# Patient Record
Sex: FEMALE | Race: WHITE
[De-identification: names, ages, dates, MRNs, and addresses within clinical notes are randomized per-mention and may not be internally consistent; named-entity substitution may affect disease eponyms.]

---

## 2018-05-31 ENCOUNTER — HOSPITAL ENCOUNTER (OUTPATIENT)
Age: 53
End: 2018-05-31
Payer: COMMERCIAL

## 2018-05-31 DIAGNOSIS — Z12.31: Primary | ICD-10-CM

## 2018-05-31 PROCEDURE — 77063 BREAST TOMOSYNTHESIS BI: CPT

## 2018-05-31 PROCEDURE — 77067 SCR MAMMO BI INCL CAD: CPT

## 2019-06-19 ENCOUNTER — HOSPITAL ENCOUNTER (OUTPATIENT)
Age: 54
Discharge: HOME | End: 2019-06-19
Payer: COMMERCIAL

## 2019-06-19 DIAGNOSIS — Z12.31: Primary | ICD-10-CM

## 2019-06-19 PROCEDURE — 77067 SCR MAMMO BI INCL CAD: CPT

## 2019-06-19 PROCEDURE — 77063 BREAST TOMOSYNTHESIS BI: CPT

## 2020-07-09 ENCOUNTER — HOSPITAL ENCOUNTER (OUTPATIENT)
Age: 55
End: 2020-07-09
Payer: COMMERCIAL

## 2020-07-09 DIAGNOSIS — Z12.31: Primary | ICD-10-CM

## 2020-07-09 PROCEDURE — 77063 BREAST TOMOSYNTHESIS BI: CPT

## 2020-07-09 PROCEDURE — 77067 SCR MAMMO BI INCL CAD: CPT

## 2022-05-19 ENCOUNTER — HOSPITAL ENCOUNTER (OUTPATIENT)
Dept: HOSPITAL 100 - OPBI | Age: 57
Discharge: HOME | End: 2022-05-19
Payer: COMMERCIAL

## 2022-05-19 DIAGNOSIS — E11.9: ICD-10-CM

## 2022-05-19 DIAGNOSIS — Z12.31: Primary | ICD-10-CM

## 2022-05-19 DIAGNOSIS — E55.9: ICD-10-CM

## 2022-05-19 DIAGNOSIS — E78.5: ICD-10-CM

## 2022-05-19 LAB
ALANINE AMINOTRANSFER ALT/SGPT: 76 U/L (ref 13–56)
ALBUMIN SERPL-MCNC: 3.7 G/DL (ref 3.2–5)
ALKALINE PHOSPHATASE: 101 U/L (ref 45–117)
ANION GAP: 10 (ref 5–15)
AST(SGOT): 35 U/L (ref 15–37)
BUN SERPL-MCNC: 13 MG/DL (ref 7–18)
BUN/CREAT RATIO: 17.9 RATIO (ref 10–20)
CALCIUM SERPL-MCNC: 9.5 MG/DL (ref 8.5–10.1)
CARBON DIOXIDE: 24 MMOL/L (ref 21–32)
CHLORIDE: 104 MMOL/L (ref 98–107)
CHOLEST SERPL-MCNC: 178 MG/DL
CREAT UR-MCNC: 101 MG/DL
DEPRECATED RDW RBC: 43 FL (ref 35.1–43.9)
ERYTHROCYTE [DISTWIDTH] IN BLOOD: 13.2 % (ref 11.6–14.6)
EST GLOM FILT RATE - AFR AMER: 106 ML/MIN (ref 60–?)
GLOBULIN: 3.8 G/DL (ref 2.2–4.2)
GLUCOSE: 114 MG/DL (ref 74–106)
HCT VFR BLD AUTO: 46.5 % (ref 37–47)
HEMOGLOBIN: 15 G/DL (ref 12–15)
HGB BLD-MCNC: 15 G/DL (ref 12–15)
IMMATURE GRANULOCYTES COUNT: 0.14 X10^3/UL (ref 0–0)
MCV RBC: 89.1 FL (ref 81–99)
MEAN CORP HGB CONC: 32.3 G/DL (ref 32–36)
MEAN PLATELET VOL.: 10.4 FL (ref 6.2–12)
MICROALBUMIN UR-MCNC: 6.1 MG/L
NRBC FLAGGED BY ANALYZER: 0 % (ref 0–5)
PLATELET # BLD: 275 K/MM3 (ref 150–450)
PLATELET COUNT: 275 K/MM3 (ref 150–450)
POTASSIUM: 3.9 MMOL/L (ref 3.5–5.1)
RBC # BLD AUTO: 5.22 M/MM3 (ref 4.2–5.4)
RBC DISTRIBUTION WIDTH CV: 13.2 % (ref 11.6–14.6)
RBC DISTRIBUTION WIDTH SD: 43 FL (ref 35.1–43.9)
TRIGLYCERIDES: 107 MG/DL
VITAMIN D,25 HYDROXY: 22.4 NG/ML
VLDLC SERPL-MCNC: 21 MG/DL (ref 5–40)
WBC # BLD AUTO: 13.2 K/MM3 (ref 4.4–11)
WHITE BLOOD COUNT: 13.2 K/MM3 (ref 4.4–11)

## 2022-05-19 PROCEDURE — 77063 BREAST TOMOSYNTHESIS BI: CPT

## 2022-05-19 PROCEDURE — 36415 COLL VENOUS BLD VENIPUNCTURE: CPT

## 2022-05-19 PROCEDURE — 77067 SCR MAMMO BI INCL CAD: CPT

## 2022-05-19 PROCEDURE — 80053 COMPREHEN METABOLIC PANEL: CPT

## 2022-05-19 PROCEDURE — 82570 ASSAY OF URINE CREATININE: CPT

## 2022-05-19 PROCEDURE — 84443 ASSAY THYROID STIM HORMONE: CPT

## 2022-05-19 PROCEDURE — 85025 COMPLETE CBC W/AUTO DIFF WBC: CPT

## 2022-05-19 PROCEDURE — 82043 UR ALBUMIN QUANTITATIVE: CPT

## 2022-05-19 PROCEDURE — 82306 VITAMIN D 25 HYDROXY: CPT

## 2022-05-19 PROCEDURE — 80061 LIPID PANEL: CPT

## 2023-04-10 ENCOUNTER — APPOINTMENT (OUTPATIENT)
Dept: URBAN - METROPOLITAN AREA CLINIC 204 | Age: 58
Setting detail: DERMATOLOGY
End: 2023-04-11

## 2023-04-10 DIAGNOSIS — I87.2 VENOUS INSUFFICIENCY (CHRONIC) (PERIPHERAL): ICD-10-CM

## 2023-04-10 DIAGNOSIS — I89.0 LYMPHEDEMA, NOT ELSEWHERE CLASSIFIED: ICD-10-CM

## 2023-04-10 PROCEDURE — OTHER MIPS QUALITY: OTHER

## 2023-04-10 PROCEDURE — 99214 OFFICE O/P EST MOD 30 MIN: CPT

## 2023-04-10 PROCEDURE — OTHER ULCER EVALUATION: OTHER

## 2023-04-10 PROCEDURE — OTHER DISCUSSION OF MANAGEMENT WITH EXTERNAL PROVIDER: OTHER

## 2023-04-10 PROCEDURE — OTHER ADDITIONAL NOTES: OTHER

## 2023-04-10 PROCEDURE — OTHER COUNSELING: OTHER

## 2023-04-10 ASSESSMENT — LOCATION SIMPLE DESCRIPTION DERM
LOCATION SIMPLE: LEFT CALF
LOCATION SIMPLE: LEFT ANKLE

## 2023-04-10 ASSESSMENT — LOCATION DETAILED DESCRIPTION DERM
LOCATION DETAILED: LEFT POSTERIOR ANKLE
LOCATION DETAILED: LEFT DISTAL CALF

## 2023-04-10 ASSESSMENT — LOCATION ZONE DERM: LOCATION ZONE: LEG

## 2023-04-10 NOTE — PROCEDURE: DISCUSSION OF MANAGEMENT WITH EXTERNAL PROVIDER
Detail Level: Zone
Pcp Name: Rebeca Chan
Pcp Discussion Details (Optional): Discussed with pt to contact and get scheduled with pcp within the week.
Pcp Name: Rebeca Chan

## 2023-04-10 NOTE — PROCEDURE: ADDITIONAL NOTES
Additional Notes: Dr. Dial assisted in evaluation. Discussed with pt to get scheduled with pcp to get coordinated with wound clinic. Pt prefers wound clinic in [place]. \\n\\Tianna Chan DO ; PRACTICE: [facility] ; ADDRESS: [address] [place] [address]. [place] [address] ; PHONE NUMBER: [phone] Additional Notes: Dr. Dial assisted in evaluation. Discussed with pt to get scheduled with pcp to get coordinated with wound clinic. Pt prefers wound clinic in [place]. \\n\\Tianna Chan DO ; PRACTICE: [facility] ; ADDRESS: [address] [place] [address]. [place] [address] ; PHONE NUMBER: [phone]

## 2023-04-10 NOTE — PROCEDURE: ULCER EVALUATION
Detail Level: Detailed
Size Of Lesion In Cm (Optional): 6
X Size Of Lesion In Cm (Optional): 2.5
Ulcer Depth (Optional): dermis
Lower Extremity Edema (Optional): yes

## 2023-04-10 NOTE — PROCEDURE: COUNSELING
Detail Level: Simple
Prescription Strength Graduated Compression Stockings Recommendations: The patient was counseled that prescription strength graduated compression stockings should be worn for all waking hours. They will follow up with a venous specialist to monitor graduated compression stocking usage and their symptoms.

## 2023-04-20 ENCOUNTER — HOSPITAL ENCOUNTER (OUTPATIENT)
Dept: HOSPITAL 100 - CVS | Age: 58
Discharge: HOME | End: 2023-04-20
Payer: COMMERCIAL

## 2023-04-20 DIAGNOSIS — M79.89: Primary | ICD-10-CM

## 2023-04-20 PROCEDURE — 93971 EXTREMITY STUDY: CPT

## 2023-04-26 ENCOUNTER — HOSPITAL ENCOUNTER (OUTPATIENT)
Dept: HOSPITAL 100 - WC | Age: 58
LOS: 4 days | Discharge: HOME | End: 2023-04-30
Payer: COMMERCIAL

## 2023-04-26 VITALS
DIASTOLIC BLOOD PRESSURE: 82 MMHG | HEART RATE: 86 BPM | SYSTOLIC BLOOD PRESSURE: 175 MMHG | TEMPERATURE: 97.34 F | RESPIRATION RATE: 16 BRPM

## 2023-04-26 VITALS — BODY MASS INDEX: 59.1 KG/M2

## 2023-04-26 DIAGNOSIS — Z79.899: ICD-10-CM

## 2023-04-26 DIAGNOSIS — I73.9: ICD-10-CM

## 2023-04-26 DIAGNOSIS — E66.9: ICD-10-CM

## 2023-04-26 DIAGNOSIS — L03.116: Primary | ICD-10-CM

## 2023-04-26 DIAGNOSIS — Z79.82: ICD-10-CM

## 2023-04-26 DIAGNOSIS — L97.909: ICD-10-CM

## 2023-04-26 DIAGNOSIS — I89.0: ICD-10-CM

## 2023-04-26 PROCEDURE — 87205 SMEAR GRAM STAIN: CPT

## 2023-04-26 PROCEDURE — 87077 CULTURE AEROBIC IDENTIFY: CPT

## 2023-04-26 PROCEDURE — 11042 DBRDMT SUBQ TIS 1ST 20SQCM/<: CPT

## 2023-04-26 PROCEDURE — 87186 SC STD MICRODIL/AGAR DIL: CPT

## 2023-04-26 PROCEDURE — 11045 DBRDMT SUBQ TISS EACH ADDL: CPT

## 2023-04-26 PROCEDURE — 99203 OFFICE O/P NEW LOW 30 MIN: CPT

## 2023-04-26 PROCEDURE — 87070 CULTURE OTHR SPECIMN AEROBIC: CPT

## 2023-04-26 PROCEDURE — 87075 CULTR BACTERIA EXCEPT BLOOD: CPT

## 2023-04-26 PROCEDURE — G0463 HOSPITAL OUTPT CLINIC VISIT: HCPCS

## 2023-05-01 VITALS
SYSTOLIC BLOOD PRESSURE: 175 MMHG | RESPIRATION RATE: 16 BRPM | DIASTOLIC BLOOD PRESSURE: 82 MMHG | TEMPERATURE: 97.34 F | HEART RATE: 86 BPM

## 2023-05-03 VITALS
DIASTOLIC BLOOD PRESSURE: 83 MMHG | TEMPERATURE: 96.98 F | RESPIRATION RATE: 18 BRPM | SYSTOLIC BLOOD PRESSURE: 188 MMHG | HEART RATE: 89 BPM

## 2023-05-10 VITALS
TEMPERATURE: 97.88 F | DIASTOLIC BLOOD PRESSURE: 93 MMHG | HEART RATE: 85 BPM | SYSTOLIC BLOOD PRESSURE: 179 MMHG | RESPIRATION RATE: 16 BRPM

## 2023-05-17 VITALS
HEART RATE: 88 BPM | SYSTOLIC BLOOD PRESSURE: 180 MMHG | RESPIRATION RATE: 18 BRPM | TEMPERATURE: 96.98 F | DIASTOLIC BLOOD PRESSURE: 87 MMHG

## 2023-05-24 VITALS
HEART RATE: 92 BPM | RESPIRATION RATE: 18 BRPM | TEMPERATURE: 96.6 F | DIASTOLIC BLOOD PRESSURE: 80 MMHG | SYSTOLIC BLOOD PRESSURE: 186 MMHG

## 2023-05-31 ENCOUNTER — HOSPITAL ENCOUNTER (OUTPATIENT)
Dept: HOSPITAL 100 - WC | Age: 58
Discharge: HOME | End: 2023-05-31
Payer: COMMERCIAL

## 2023-05-31 ENCOUNTER — HOSPITAL ENCOUNTER (OUTPATIENT)
Dept: HOSPITAL 100 - OPBI | Age: 58
Discharge: HOME | End: 2023-05-31
Payer: COMMERCIAL

## 2023-05-31 VITALS
DIASTOLIC BLOOD PRESSURE: 87 MMHG | TEMPERATURE: 97.5 F | HEART RATE: 91 BPM | RESPIRATION RATE: 18 BRPM | SYSTOLIC BLOOD PRESSURE: 180 MMHG

## 2023-05-31 DIAGNOSIS — L03.116: ICD-10-CM

## 2023-05-31 DIAGNOSIS — E11.51: ICD-10-CM

## 2023-05-31 DIAGNOSIS — Z79.899: ICD-10-CM

## 2023-05-31 DIAGNOSIS — E66.9: ICD-10-CM

## 2023-05-31 DIAGNOSIS — Z12.31: Primary | ICD-10-CM

## 2023-05-31 DIAGNOSIS — L97.321: ICD-10-CM

## 2023-05-31 DIAGNOSIS — Z79.82: ICD-10-CM

## 2023-05-31 DIAGNOSIS — I89.0: ICD-10-CM

## 2023-05-31 DIAGNOSIS — E11.622: Primary | ICD-10-CM

## 2023-05-31 PROCEDURE — 11045 DBRDMT SUBQ TISS EACH ADDL: CPT

## 2023-05-31 PROCEDURE — 97598 DBRDMT OPN WND ADDL 20CM/<: CPT

## 2023-05-31 PROCEDURE — 77067 SCR MAMMO BI INCL CAD: CPT

## 2023-05-31 PROCEDURE — 11042 DBRDMT SUBQ TIS 1ST 20SQCM/<: CPT

## 2023-05-31 PROCEDURE — 77063 BREAST TOMOSYNTHESIS BI: CPT

## 2023-05-31 PROCEDURE — 97597 DBRDMT OPN WND 1ST 20 CM/<: CPT

## 2023-06-01 VITALS
TEMPERATURE: 97.5 F | HEART RATE: 91 BPM | DIASTOLIC BLOOD PRESSURE: 87 MMHG | SYSTOLIC BLOOD PRESSURE: 180 MMHG | RESPIRATION RATE: 18 BRPM

## 2023-06-07 VITALS — DIASTOLIC BLOOD PRESSURE: 90 MMHG | SYSTOLIC BLOOD PRESSURE: 176 MMHG | TEMPERATURE: 97.16 F | HEART RATE: 92 BPM

## 2023-06-14 VITALS
DIASTOLIC BLOOD PRESSURE: 84 MMHG | SYSTOLIC BLOOD PRESSURE: 164 MMHG | RESPIRATION RATE: 18 BRPM | HEART RATE: 84 BPM | TEMPERATURE: 96.3 F

## 2023-06-21 VITALS
DIASTOLIC BLOOD PRESSURE: 84 MMHG | HEART RATE: 88 BPM | RESPIRATION RATE: 18 BRPM | TEMPERATURE: 97.6 F | SYSTOLIC BLOOD PRESSURE: 160 MMHG

## 2023-06-28 ENCOUNTER — HOSPITAL ENCOUNTER (OUTPATIENT)
Dept: HOSPITAL 100 - WC | Age: 58
LOS: 2 days | Discharge: HOME | End: 2023-06-30
Payer: COMMERCIAL

## 2023-06-28 VITALS
SYSTOLIC BLOOD PRESSURE: 182 MMHG | TEMPERATURE: 97.52 F | RESPIRATION RATE: 16 BRPM | DIASTOLIC BLOOD PRESSURE: 83 MMHG | HEART RATE: 88 BPM

## 2023-06-28 VITALS — BODY MASS INDEX: 59.1 KG/M2

## 2023-06-28 DIAGNOSIS — I89.0: ICD-10-CM

## 2023-06-28 DIAGNOSIS — L97.821: Primary | ICD-10-CM

## 2023-06-28 DIAGNOSIS — L03.116: ICD-10-CM

## 2023-06-28 DIAGNOSIS — Z79.82: ICD-10-CM

## 2023-06-28 DIAGNOSIS — Z79.899: ICD-10-CM

## 2023-06-28 DIAGNOSIS — I73.9: ICD-10-CM

## 2023-06-28 PROCEDURE — 87186 SC STD MICRODIL/AGAR DIL: CPT

## 2023-06-28 PROCEDURE — 87070 CULTURE OTHR SPECIMN AEROBIC: CPT

## 2023-06-28 PROCEDURE — 97598 DBRDMT OPN WND ADDL 20CM/<: CPT

## 2023-06-28 PROCEDURE — 87205 SMEAR GRAM STAIN: CPT

## 2023-06-28 PROCEDURE — 87077 CULTURE AEROBIC IDENTIFY: CPT

## 2023-06-28 PROCEDURE — 97597 DBRDMT OPN WND 1ST 20 CM/<: CPT

## 2023-06-28 PROCEDURE — 87075 CULTR BACTERIA EXCEPT BLOOD: CPT

## 2023-06-28 PROCEDURE — 11045 DBRDMT SUBQ TISS EACH ADDL: CPT

## 2023-06-28 PROCEDURE — 11042 DBRDMT SUBQ TIS 1ST 20SQCM/<: CPT

## 2023-07-01 VITALS
RESPIRATION RATE: 16 BRPM | DIASTOLIC BLOOD PRESSURE: 83 MMHG | TEMPERATURE: 97.52 F | SYSTOLIC BLOOD PRESSURE: 182 MMHG | HEART RATE: 88 BPM

## 2023-07-05 ENCOUNTER — HOSPITAL ENCOUNTER (OUTPATIENT)
Dept: HOSPITAL 100 - WC | Age: 58
LOS: 2 days | Discharge: HOME | End: 2023-07-07
Payer: COMMERCIAL

## 2023-07-05 VITALS
TEMPERATURE: 97.34 F | SYSTOLIC BLOOD PRESSURE: 167 MMHG | DIASTOLIC BLOOD PRESSURE: 90 MMHG | HEART RATE: 79 BPM | RESPIRATION RATE: 18 BRPM

## 2023-07-05 VITALS — BODY MASS INDEX: 59.1 KG/M2

## 2023-07-05 DIAGNOSIS — Z79.82: ICD-10-CM

## 2023-07-05 DIAGNOSIS — I73.9: ICD-10-CM

## 2023-07-05 DIAGNOSIS — E66.9: ICD-10-CM

## 2023-07-05 DIAGNOSIS — Z09: Primary | ICD-10-CM

## 2023-07-05 DIAGNOSIS — I89.0: ICD-10-CM

## 2023-07-05 PROCEDURE — G0463 HOSPITAL OUTPT CLINIC VISIT: HCPCS

## 2023-07-05 PROCEDURE — 99213 OFFICE O/P EST LOW 20 MIN: CPT
